# Patient Record
Sex: FEMALE | Race: OTHER | URBAN - METROPOLITAN AREA
[De-identification: names, ages, dates, MRNs, and addresses within clinical notes are randomized per-mention and may not be internally consistent; named-entity substitution may affect disease eponyms.]

---

## 2018-06-19 NOTE — PATIENT DISCUSSION
CATARACTS, OU - QUESTIONABLY VISUALLY SIGNIFICANT, WILL TREAT DRY EYE FIRST. DISC OPTION OF GLS-VS REFIT FOR CONTACT LENS -VS-FOLLOW. GLASSES PRESCRIPTION GIVEN IN THE EVENT PATIENT DESIRES FULL TIME CORRECTION. RECOMMEND F/U IN 1-2 YEARS OR SOONER IF VISION CHANGES OCCUR.

## 2018-06-19 NOTE — PATIENT DISCUSSION
APRIL/BLEPHARITIS/MGD, OU- PRESCRIBE WARM COMPRESSES AND EYELID SCRUBS BID, INCREASE ARTIFICIAL TEARS TO BID-QID, AND THE DAILY INTAKE OF OMEGA-3 FATTY ACIDS.

## 2020-01-23 ENCOUNTER — IMPORTED ENCOUNTER (OUTPATIENT)
Dept: URBAN - METROPOLITAN AREA CLINIC 9 | Facility: CLINIC | Age: 65
End: 2020-01-23

## 2021-05-25 ENCOUNTER — IMPORTED ENCOUNTER (OUTPATIENT)
Dept: URBAN - METROPOLITAN AREA CLINIC 9 | Facility: CLINIC | Age: 66
End: 2021-05-25

## 2021-10-18 ASSESSMENT — VISUAL ACUITY
OS_CC: 20/20 SN
OD_SC: 20/20 - SN
OD_CC: 20/20 SN
OS_SC: 20/30 SN
OD_SC: 20/30 - SN
OS_SC: 20/40 +2 SN

## 2021-10-18 ASSESSMENT — TONOMETRY
OS_IOP_MMHG: 12
OS_IOP_MMHG: 13
OD_IOP_MMHG: 11
OD_IOP_MMHG: 10

## 2021-10-18 ASSESSMENT — KERATOMETRY
OD_AXISANGLE2_DEGREES: 104
OD_AXISANGLE_DEGREES: 29
OD_K2POWER_DIOPTERS: 46
OD_K2POWER_DIOPTERS: 45.5
OS_AXISANGLE_DEGREES: 166
OS_K2POWER_DIOPTERS: 46.25
OS_K2POWER_DIOPTERS: 46
OD_K1POWER_DIOPTERS: 45
OD_AXISANGLE_DEGREES: 14
OD_AXISANGLE2_DEGREES: 119
OS_AXISANGLE_DEGREES: 169
OS_K1POWER_DIOPTERS: 45.25
OS_AXISANGLE2_DEGREES: 76
OS_AXISANGLE2_DEGREES: 79
OD_K1POWER_DIOPTERS: 45.5
OS_K1POWER_DIOPTERS: 45.5

## 2022-06-29 RX ORDER — ALBUTEROL SULFATE 2.5 MG/3ML
SOLUTION RESPIRATORY (INHALATION)
COMMUNITY

## 2022-06-29 RX ORDER — PREDNISONE 10 MG/1
TABLET ORAL
COMMUNITY

## 2022-06-29 RX ORDER — LORAZEPAM 0.5 MG/1
TABLET ORAL
COMMUNITY

## 2022-06-29 RX ORDER — SULFASALAZINE 500 MG/1
TABLET ORAL
COMMUNITY

## 2022-06-29 RX ORDER — FLUTICASONE PROPIONATE 50 MCG
SPRAY, SUSPENSION (ML) NASAL
COMMUNITY

## 2023-07-24 ENCOUNTER — ESTABLISHED PATIENT (OUTPATIENT)
Dept: URBAN - METROPOLITAN AREA CLINIC 9 | Facility: CLINIC | Age: 68
End: 2023-07-24

## 2023-07-24 DIAGNOSIS — H04.123: ICD-10-CM

## 2023-07-24 DIAGNOSIS — H25.13: ICD-10-CM

## 2023-07-24 DIAGNOSIS — H43.813: ICD-10-CM

## 2023-07-24 DIAGNOSIS — H52.223: ICD-10-CM

## 2023-07-24 PROCEDURE — 92015 DETERMINE REFRACTIVE STATE: CPT

## 2023-07-24 PROCEDURE — 92014 COMPRE OPH EXAM EST PT 1/>: CPT

## 2023-07-24 ASSESSMENT — KERATOMETRY
OD_K2POWER_DIOPTERS: 45.5
OS_AXISANGLE_DEGREES: 169
OD_AXISANGLE2_DEGREES: 119
OD_AXISANGLE_DEGREES: 29
OS_AXISANGLE2_DEGREES: 79
OS_K2POWER_DIOPTERS: 46.25
OD_K1POWER_DIOPTERS: 45
OS_K1POWER_DIOPTERS: 45.5

## 2023-07-24 ASSESSMENT — VISUAL ACUITY
OU_SC: 20/20
OD_GLARE: 20/30
OD_SC: 20/20-2
OD_CC: J1+
OS_SC: 20/30-1
OS_GLARE: 20/30
OS_CC: J1+
OU_CC: J1+

## 2023-07-24 ASSESSMENT — TONOMETRY
OS_IOP_MMHG: 14
OD_IOP_MMHG: 12

## 2024-05-17 ENCOUNTER — ESTABLISHED PATIENT (OUTPATIENT)
Dept: URBAN - METROPOLITAN AREA CLINIC 16 | Facility: CLINIC | Age: 69
End: 2024-05-17

## 2024-05-17 DIAGNOSIS — H25.13: ICD-10-CM

## 2024-05-17 DIAGNOSIS — H02.834: ICD-10-CM

## 2024-05-17 DIAGNOSIS — H02.831: ICD-10-CM

## 2024-05-17 PROCEDURE — 99214 OFFICE O/P EST MOD 30 MIN: CPT

## 2024-05-17 ASSESSMENT — KERATOMETRY
OD_K2POWER_DIOPTERS: 45.5
OS_AXISANGLE_DEGREES: 169
OD_AXISANGLE_DEGREES: 29
OD_AXISANGLE2_DEGREES: 119
OS_K1POWER_DIOPTERS: 45.5
OS_K2POWER_DIOPTERS: 46.25
OS_AXISANGLE2_DEGREES: 79
OD_K1POWER_DIOPTERS: 45

## 2024-05-17 ASSESSMENT — TONOMETRY
OD_IOP_MMHG: 14
OS_IOP_MMHG: 13

## 2024-05-17 ASSESSMENT — VISUAL ACUITY
OD_SC: 20/25
OS_SC: 20/30